# Patient Record
Sex: FEMALE | Race: WHITE | NOT HISPANIC OR LATINO | URBAN - METROPOLITAN AREA
[De-identification: names, ages, dates, MRNs, and addresses within clinical notes are randomized per-mention and may not be internally consistent; named-entity substitution may affect disease eponyms.]

---

## 2018-07-10 ENCOUNTER — OFFICE VISIT (OUTPATIENT)
Dept: CARDIOLOGY | Facility: CLINIC | Age: 53
End: 2018-07-10
Payer: COMMERCIAL

## 2018-07-10 VITALS
DIASTOLIC BLOOD PRESSURE: 94 MMHG | HEIGHT: 64 IN | BODY MASS INDEX: 29.53 KG/M2 | SYSTOLIC BLOOD PRESSURE: 128 MMHG | HEART RATE: 75 BPM | WEIGHT: 173 LBS | RESPIRATION RATE: 14 BRPM | OXYGEN SATURATION: 98 %

## 2018-07-10 DIAGNOSIS — R07.89 OTHER CHEST PAIN: ICD-10-CM

## 2018-07-10 DIAGNOSIS — R00.2 PALPITATIONS: Primary | ICD-10-CM

## 2018-07-10 PROCEDURE — 99243 OFF/OP CNSLTJ NEW/EST LOW 30: CPT | Performed by: INTERNAL MEDICINE

## 2018-07-10 PROCEDURE — 93000 ELECTROCARDIOGRAM COMPLETE: CPT | Performed by: INTERNAL MEDICINE

## 2018-07-10 ASSESSMENT — ENCOUNTER SYMPTOMS
SNORING: 0
SPUTUM PRODUCTION: 0
NERVOUS/ANXIOUS: 0
BRUISES/BLEEDS EASILY: 0
CLAUDICATION: 0
HEMOPTYSIS: 0
EXCESSIVE DAYTIME SLEEPINESS: 0
BACK PAIN: 0
JAUNDICE: 0
HEMATURIA: 0
NEAR-SYNCOPE: 0
COLOR CHANGE: 0
LIGHT-HEADEDNESS: 0
IRREGULAR HEARTBEAT: 0
DYSPNEA ON EXERTION: 0
WHEEZING: 0
UNUSUAL HAIR DISTRIBUTION: 0
FALLS: 0
ORTHOPNEA: 0
DIZZINESS: 0
PND: 0
HEADACHES: 0
FEVER: 0
CHILLS: 0
FLANK PAIN: 0
SYNCOPE: 0
HEMATEMESIS: 0
MYALGIAS: 0
FOCAL WEAKNESS: 0
MEMORY LOSS: 0
PALPITATIONS: 1
SHORTNESS OF BREATH: 0

## 2018-07-10 NOTE — LETTER
"July 10, 2018     Kodi Smith, DO  [address] [facility] [address]    Patient: Jazmyn Herrmann   YOB: 1965   Date of Visit: 7/10/2018       Dear Dr. Smith:    Thank you for referring Jazmyn Herrmann to me for evaluation. Below are my notes for this consultation.    If you have questions, please do not hesitate to call me. I look forward to following your patient along with you.         Sincerely,        Alicia Calix MD        CC: No Recipients  Alicia Calix MD  7/10/2018  3:47 PM  Signed  Cardiology Consult/New Patient    HPI   The patient presents to the office today for evaluation regarding some intermittent palpitations and chest discomfort.  She is a 53-year-old white female whose past medical history is unremarkable aside from a  section and herniated disks in her back.  She has not had any recent hospitalizations or emergency room visits.  Recently she has noticed some intermittent episodes where she gets strange feelings in her chest.  One time while she was driving she felt a sensation of a \"electric shock\" that traveled up her right parasternal border and resolve spontaneously.  She does not have any exertional chest pain, but says that there are times that she feels her chest is not quite normal, but she has difficulty describing it.    The patient goes to the gym several times a week and can do approximately 3 miles on the elliptical without a problem.  She denies any change in her ability to do this.  She denies PND or orthopnea.  On one occasion her symptoms may have been precipitated by a \"metabolism booster\" that she purchased, but she has not used any of this for some time and still has symptoms.  She denies any recent blood work or other cardiac evaluation.  She has not had any significant changes in her dietary intake.  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    Patient has 3 children, 1 set of twins.  She had placenta previa but denies toxemia or preeclampsia.       Past Medical History:   Diagnosis Date   • Lumbar disc herniation     L5 & S1   • Placenta previa     Patient underwent  and delivered twins       Past Surgical History:   Procedure Laterality Date   •  SECTION  2002    Secondary to placenta previa     Social History   Substance Use Topics   • Smoking status: Never Smoker   • Smokeless tobacco: Never Used   • Alcohol use Yes      Comment: 1-2 drinks a week     Family History   Problem Relation Age of Onset   • Adopted: Yes   • Family history unknown: Yes        Allergies: Patient has no known allergies.    Current Medications: No current outpatient prescriptions on file.     Review of Systems   Constitution: Negative for chills and fever.   HENT: Negative for nosebleeds.    Eyes: Negative for visual disturbance.   Cardiovascular: Positive for chest pain and palpitations. Negative for claudication, dyspnea on exertion, irregular heartbeat, leg swelling, near-syncope, orthopnea, paroxysmal nocturnal dyspnea and syncope.   Respiratory: Negative for hemoptysis, shortness of breath, snoring, sputum production and wheezing.    Endocrine: Negative for heat intolerance.   Hematologic/Lymphatic: Negative for bleeding problem. Does not bruise/bleed easily.   Skin: Negative for color change and unusual hair distribution.   Musculoskeletal: Negative for back pain, falls and myalgias.   Gastrointestinal: Negative for hematemesis, jaundice and melena.   Genitourinary: Negative for flank pain and hematuria.        Patient has not had menses for almost 1 year   Neurological: Negative for excessive daytime sleepiness, dizziness, focal weakness, headaches and light-headedness.   Psychiatric/Behavioral: Negative for memory loss. The patient is not nervous/anxious.         Increased stress   Allergic/Immunologic: Negative for  "environmental allergies.     Vitals:    07/10/18 1006   BP: (!) 128/94   Pulse: 75   Resp: 14   SpO2: 98%   Weight: 78.5 kg (173 lb)   Height: 1.626 m (5' 4\")     Physical Exam   Constitutional: She is oriented to person, place, and time. She appears well-developed. She is cooperative.   Overweight white female in no apparent distress   HENT:   Head: Normocephalic and atraumatic.   Mouth/Throat: Oropharynx is clear and moist and mucous membranes are normal.   Eyes: Conjunctivae and EOM are normal. No scleral icterus.   Neck: Trachea normal and normal range of motion. Neck supple. No hepatojugular reflux and no JVD present. Carotid bruit is not present. No thyromegaly present.   Cardiovascular: Normal rate, regular rhythm, S1 normal, S2 normal, normal heart sounds and intact distal pulses.   No extrasystoles are present. Exam reveals no gallop, no S3, no S4 and no friction rub.    No murmur heard.  Pulses:       Carotid pulses are 2+ on the right side, and 2+ on the left side.       Posterior tibial pulses are 2+ on the right side, and 2+ on the left side.   Pulmonary/Chest: Effort normal and breath sounds normal. She has no wheezes. She has no rhonchi. She has no rales.   Abdominal: Soft. Bowel sounds are normal. She exhibits no abdominal bruit. There is no tenderness.   Musculoskeletal: Normal range of motion. She exhibits no tenderness or deformity.   Neurological: She is alert and oriented to person, place, and time. No cranial nerve deficit.   Skin: Skin is warm, dry and intact. No rash noted. No cyanosis. Nails show no clubbing.   Psychiatric: She has a normal mood and affect. Her speech is normal and behavior is normal. Thought content normal. Cognition and memory are normal.        Labs-no recent lab work    Imaging: No recent imaging    EKG: Sinus rhythm, poor R-wave progression and low voltage in precordial leads, likely secondary to body habitus       Other chest pain  The patient has intermittent chest " discomfort of varying types.  She has no idea regarding her family history because she was adopted.  I asked the patient to return to the office for an echocardiogram and routine treadmill stress test.  We should rule out any wall motion abnormality or structural heart disease.  I am also providing her with an order for lab work-she states she has not had anything done recently.  We discussed other options for risk stratification and she would like to move forward with CT calcium scoring.  I will contact her to discuss all of her test results by telephone.    Palpitations  Palpitations are random and self terminating.  I think her symptoms could be related to hormonal changes, but I am ordering routine chemistries, TSH, lipid panel and liver function testing for a baseline.  She should avoid metabolism boosters or other excessive stimulants such as caffeine.      Return if symptoms worsen or fail to improve.  I will contact her by telephone to review all of her test results.  She should continue with regular exercise and weight loss efforts.  Thank you for allowing me to participate in the care of this patient.  I hope this information is helpful.  Please do not hesitate to contact me with any questions or concerns.    Alicia Calix MD  7/10/2018

## 2018-07-10 NOTE — ASSESSMENT & PLAN NOTE
The patient has intermittent chest discomfort of varying types.  She has no idea regarding her family history because she was adopted.  I asked the patient to return to the office for an echocardiogram and routine treadmill stress test.  We should rule out any wall motion abnormality or structural heart disease.  I am also providing her with an order for lab work-she states she has not had anything done recently.  We discussed other options for risk stratification and she would like to move forward with CT calcium scoring.  I will contact her to discuss all of her test results by telephone.

## 2018-07-10 NOTE — ASSESSMENT & PLAN NOTE
Palpitations are random and self terminating.  I think her symptoms could be related to hormonal changes, but I am ordering routine chemistries, TSH, lipid panel and liver function testing for a baseline.  She should avoid metabolism boosters or other excessive stimulants such as caffeine.

## 2018-07-10 NOTE — PROGRESS NOTES
"Cardiology Consult/New Patient    HPI   The patient presents to the office today for evaluation regarding some intermittent palpitations and chest discomfort.  She is a 53-year-old white female whose past medical history is unremarkable aside from a  section and herniated disks in her back.  She has not had any recent hospitalizations or emergency room visits.  Recently she has noticed some intermittent episodes where she gets strange feelings in her chest.  One time while she was driving she felt a sensation of a \"electric shock\" that traveled up her right parasternal border and resolve spontaneously.  She does not have any exertional chest pain, but says that there are times that she feels her chest is not quite normal, but she has difficulty describing it.    The patient goes to the gym several times a week and can do approximately 3 miles on the elliptical without a problem.  She denies any change in her ability to do this.  She denies PND or orthopnea.  On one occasion her symptoms may have been precipitated by a \"metabolism booster\" that she purchased, but she has not used any of this for some time and still has symptoms.  She denies any recent blood work or other cardiac evaluation.  She has not had any significant changes in her dietary intake.  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    Patient has 3 children, 1 set of twins.  She had placenta previa but denies toxemia or preeclampsia.       Past Medical History:   Diagnosis Date   • Lumbar disc herniation     L5 & S1   • Placenta previa     Patient underwent  and delivered twins       Past Surgical History:   Procedure Laterality Date   •  SECTION  2002    Secondary to placenta previa     Social History   Substance Use Topics   • Smoking status: Never Smoker   • Smokeless tobacco: Never Used   • Alcohol use Yes " "     Comment: 1-2 drinks a week     Family History   Problem Relation Age of Onset   • Adopted: Yes   • Family history unknown: Yes        Allergies: Patient has no known allergies.    Current Medications: No current outpatient prescriptions on file.     Review of Systems   Constitution: Negative for chills and fever.   HENT: Negative for nosebleeds.    Eyes: Negative for visual disturbance.   Cardiovascular: Positive for chest pain and palpitations. Negative for claudication, dyspnea on exertion, irregular heartbeat, leg swelling, near-syncope, orthopnea, paroxysmal nocturnal dyspnea and syncope.   Respiratory: Negative for hemoptysis, shortness of breath, snoring, sputum production and wheezing.    Endocrine: Negative for heat intolerance.   Hematologic/Lymphatic: Negative for bleeding problem. Does not bruise/bleed easily.   Skin: Negative for color change and unusual hair distribution.   Musculoskeletal: Negative for back pain, falls and myalgias.   Gastrointestinal: Negative for hematemesis, jaundice and melena.   Genitourinary: Negative for flank pain and hematuria.        Patient has not had menses for almost 1 year   Neurological: Negative for excessive daytime sleepiness, dizziness, focal weakness, headaches and light-headedness.   Psychiatric/Behavioral: Negative for memory loss. The patient is not nervous/anxious.         Increased stress   Allergic/Immunologic: Negative for environmental allergies.     Vitals:    07/10/18 1006   BP: (!) 128/94   Pulse: 75   Resp: 14   SpO2: 98%   Weight: 78.5 kg (173 lb)   Height: 1.626 m (5' 4\")     Physical Exam   Constitutional: She is oriented to person, place, and time. She appears well-developed. She is cooperative.   Overweight white female in no apparent distress   HENT:   Head: Normocephalic and atraumatic.   Mouth/Throat: Oropharynx is clear and moist and mucous membranes are normal.   Eyes: Conjunctivae and EOM are normal. No scleral icterus.   Neck: Trachea " normal and normal range of motion. Neck supple. No hepatojugular reflux and no JVD present. Carotid bruit is not present. No thyromegaly present.   Cardiovascular: Normal rate, regular rhythm, S1 normal, S2 normal, normal heart sounds and intact distal pulses.   No extrasystoles are present. Exam reveals no gallop, no S3, no S4 and no friction rub.    No murmur heard.  Pulses:       Carotid pulses are 2+ on the right side, and 2+ on the left side.       Posterior tibial pulses are 2+ on the right side, and 2+ on the left side.   Pulmonary/Chest: Effort normal and breath sounds normal. She has no wheezes. She has no rhonchi. She has no rales.   Abdominal: Soft. Bowel sounds are normal. She exhibits no abdominal bruit. There is no tenderness.   Musculoskeletal: Normal range of motion. She exhibits no tenderness or deformity.   Neurological: She is alert and oriented to person, place, and time. No cranial nerve deficit.   Skin: Skin is warm, dry and intact. No rash noted. No cyanosis. Nails show no clubbing.   Psychiatric: She has a normal mood and affect. Her speech is normal and behavior is normal. Thought content normal. Cognition and memory are normal.        Labs-no recent lab work    Imaging: No recent imaging    EKG: Sinus rhythm, poor R-wave progression and low voltage in precordial leads, likely secondary to body habitus       Other chest pain  The patient has intermittent chest discomfort of varying types.  She has no idea regarding her family history because she was adopted.  I asked the patient to return to the office for an echocardiogram and routine treadmill stress test.  We should rule out any wall motion abnormality or structural heart disease.  I am also providing her with an order for lab work-she states she has not had anything done recently.  We discussed other options for risk stratification and she would like to move forward with CT calcium scoring.  I will contact her to discuss all of her test  results by telephone.    Palpitations  Palpitations are random and self terminating.  I think her symptoms could be related to hormonal changes, but I am ordering routine chemistries, TSH, lipid panel and liver function testing for a baseline.  She should avoid metabolism boosters or other excessive stimulants such as caffeine.      Return if symptoms worsen or fail to improve.  I will contact her by telephone to review all of her test results.  She should continue with regular exercise and weight loss efforts.  Thank you for allowing me to participate in the care of this patient.  I hope this information is helpful.  Please do not hesitate to contact me with any questions or concerns.    Alicia Calix MD  7/10/2018

## 2018-07-18 LAB
ALBUMIN SERPL-MCNC: 4.3 G/DL (ref 3.5–5.5)
ALP SERPL-CCNC: 93 IU/L (ref 39–117)
ALT SERPL-CCNC: 17 IU/L (ref 0–32)
AST SERPL-CCNC: 19 IU/L (ref 0–40)
BILIRUB DIRECT SERPL-MCNC: 0.09 MG/DL (ref 0–0.4)
BILIRUB SERPL-MCNC: 0.3 MG/DL (ref 0–1.2)
BUN SERPL-MCNC: 10 MG/DL (ref 6–24)
BUN/CREAT SERPL: 11 (ref 9–23)
CALCIUM SERPL-MCNC: 9.7 MG/DL (ref 8.7–10.2)
CHLORIDE SERPL-SCNC: 102 MMOL/L (ref 96–106)
CHOLEST SERPL-MCNC: 223 MG/DL (ref 100–199)
CO2 SERPL-SCNC: 26 MMOL/L (ref 20–29)
CREAT SERPL-MCNC: 0.88 MG/DL (ref 0.57–1)
GLUCOSE SERPL-MCNC: 112 MG/DL (ref 65–99)
HBA1C MFR BLD: 6.2 % (ref 4.8–5.6)
HDLC SERPL-MCNC: 63 MG/DL
LAB CORP EGFR IF AFRICN AM: 88 ML/MIN/1.73
LAB CORP EGFR IF NONAFRICN AM: 76 ML/MIN/1.73
LDLC SERPL CALC-MCNC: 124 MG/DL (ref 0–99)
POTASSIUM SERPL-SCNC: 4.7 MMOL/L (ref 3.5–5.2)
PROT SERPL-MCNC: 7.1 G/DL (ref 6–8.5)
SODIUM SERPL-SCNC: 143 MMOL/L (ref 134–144)
TRIGL SERPL-MCNC: 181 MG/DL (ref 0–149)
TSH SERPL DL<=0.005 MIU/L-ACNC: 1.88 UIU/ML (ref 0.45–4.5)
VLDLC SERPL CALC-MCNC: 36 MG/DL (ref 5–40)

## 2018-08-07 ENCOUNTER — HOSPITAL ENCOUNTER (OUTPATIENT)
Dept: CARDIOLOGY | Facility: CLINIC | Age: 53
Discharge: HOME | End: 2018-08-07
Payer: COMMERCIAL

## 2018-08-07 VITALS
SYSTOLIC BLOOD PRESSURE: 120 MMHG | BODY MASS INDEX: 29.02 KG/M2 | HEIGHT: 64 IN | DIASTOLIC BLOOD PRESSURE: 70 MMHG | WEIGHT: 170 LBS

## 2018-08-07 VITALS
BODY MASS INDEX: 29.02 KG/M2 | WEIGHT: 170 LBS | DIASTOLIC BLOOD PRESSURE: 70 MMHG | HEIGHT: 64 IN | SYSTOLIC BLOOD PRESSURE: 120 MMHG

## 2018-08-07 DIAGNOSIS — R00.2 PALPITATIONS: ICD-10-CM

## 2018-08-07 DIAGNOSIS — R07.89 OTHER CHEST PAIN: ICD-10-CM

## 2018-08-07 LAB
AORTIC ROOT ANNULUS - M-MODE: 3.5 CM
AORTIC VALVE MEAN VELOCITY: 0.81 M/S
AORTIC VALVE VELOCITY TIME INTEGRAL: 24.6 CM
ASCENDING AORTA: 3.2 CM
AV MEAN GRADIENT: 3 MMHG
AV PEAK GRADIENT: 6 MMHG
AV PEAK VELOCITY-S: 1.27 M/S
AV VALVE AREA: 2.04 CM2
BSA FOR ECHO PROCEDURE: 1.87 M2
CUSP SEPARATION: 1.9 CM
DOP CALC LVOT STROKE VOLUME: 50.16 ML
E WAVE DECELERATION TIME: 232 MS
E/A RATIO: 1.1
E/E' RATIO: 8.8
E/LAT E' RATIO: 7.4
EDV (BP): 72.2 CM3
EF (A4C): 57 %
EF A2C: 68.3 %
EJECTION FRACTION: 64.4 %
ESV (BP): 25.7 CM3
FRACTIONAL SHORTENING: 34.5 %
INTERVENTRICULAR SEPTUM: 0.78 CM
LA ESV (BP): 49.9 CM3
LA ESV INDEX (A2C): 24.71 CM3/M2
LA ESV INDEX (BP): 26.68 CM3/M2
LA/AORTA RATIO: 1.17
LAAS-AP2: 17.6 CM2
LAAS-AP4: 17.4 CM2
LAD 2D - M-MODE: 4.1 CM
LALD A4C: 4.99 CM
LALD A4C: 5.38 CM
LAV-S: 46.2 CM3
LEFT ATRIUM VOLUME INDEX: 27.11 CM3/M2
LEFT ATRIUM VOLUME: 50.7 CM3
LEFT INTERNAL DIMENSION IN SYSTOLE: 3.25 CM (ref 2.54–3.84)
LEFT VENTRICLE DIASTOLIC VOLUME INDEX: 40.75 CM3/M2
LEFT VENTRICLE DIASTOLIC VOLUME: 76.2 CM3
LEFT VENTRICLE SYSTOLIC VOLUME INDEX: 17.49 CM3/M2
LEFT VENTRICLE SYSTOLIC VOLUME: 32.7 CM3
LEFT VENTRICULAR INTERNAL DIMENSION IN DIASTOLE: 4.96 CM (ref 4.29–5.96)
LEFT VENTRICULAR POSTERIOR WALL IN END DIASTOLE: 0.75 CM (ref 0.56–1.05)
LV DIASTOLIC VOLUME: 65 CM3
LV ESV (APICAL 2 CHAMBER): 20.6 CM3
LVAD-AP2: 23.1 CM2
LVAD-AP4: 25.9 CM2
LVAS-AP2: 12.2 CM2
LVAS-AP4: 15 CM2
LVEDVI(A2C): 34.76 CM3/M2
LVEDVI(BP): 38.61 CM3/M2
LVESVI(A2C): 11.02 CM3/M2
LVESVI(BP): 13.74 CM3/M2
LVLD-AP2: 6.91 CM
LVLD-AP4: 7.32 CM
LVLS-AP2: 6.12 CM
LVLS-AP4: 6.08 CM
LVOT 2D: 1.9 CM
LVOT A: 2.84 CM2
LVOT MG: 2 MMHG
LVOT MV: 0.62 M/S
LVOT PEAK VELOCITY: 0.89 M/S
LVOT VTI: 17.7 CM
MV E'TISSUE VEL-LAT: 0.1 M/S
MV E'TISSUE VEL-MED: 0.09 M/S
MV PEAK A VEL: 0.7 M/S
MV PEAK E VEL: 0.75 M/S
POSTERIOR WALL: 0.75 CM
RVOT VMAX: 0.62 M/S
TR MAX PG: 9 MMHG
TRICUSPID VALVE PEAK REGURGITATION VELOCITY: 1.54 M/S
Z-SCORE OF LEFT VENTRICULAR DIMENSION IN END DIASTOLE: -0.19
Z-SCORE OF LEFT VENTRICULAR DIMENSION IN END SYSTOLE: 0.32
Z-SCORE OF LEFT VENTRICULAR POSTERIOR WALL IN END DIASTOLE: -0.12

## 2018-08-07 PROCEDURE — 93306 TTE W/DOPPLER COMPLETE: CPT | Performed by: INTERNAL MEDICINE

## 2018-08-13 NOTE — PROGRESS NOTES
Called and reviewed test results.  Advised increased exercise and dietary discretion.  The patient would prefer to avoid medication.  She had her CT calcium score performed at [facility] on [address] and I will try to get those results.

## 2018-08-14 LAB
STRESS ANGINA INDEX: 0
STRESS BASELINE BP: NORMAL MMHG
STRESS BASELINE HR: 80 BPM
STRESS O2 SAT REST: 93 %
STRESS PERCENT HR: 94 %
STRESS POST ESTIMATED WORKLOAD: 12 METS
STRESS POST EXERCISE DUR MIN: 9 MIN
STRESS POST EXERCISE DUR SEC: 2 SEC
STRESS POST PEAK BP: NORMAL MMHG
STRESS POST PEAK HR: 159 BPM
STRESS TARGET HR: 144 BPM

## 2023-01-26 ENCOUNTER — APPOINTMENT (RX ONLY)
Dept: URBAN - METROPOLITAN AREA CLINIC 23 | Facility: CLINIC | Age: 58
Setting detail: DERMATOLOGY
End: 2023-01-26

## 2023-01-26 DIAGNOSIS — L60.1 ONYCHOLYSIS: ICD-10-CM

## 2023-01-26 PROCEDURE — ? TREATMENT REGIMEN

## 2023-01-26 PROCEDURE — ? PHOTO-DOCUMENTATION

## 2023-01-26 PROCEDURE — ? COUNSELING

## 2023-01-26 PROCEDURE — 99202 OFFICE O/P NEW SF 15 MIN: CPT

## 2023-01-26 PROCEDURE — ? DIAGNOSIS COMMENT

## 2023-01-26 ASSESSMENT — LOCATION SIMPLE DESCRIPTION DERM: LOCATION SIMPLE: LEFT RING FINGERNAIL

## 2023-01-26 ASSESSMENT — LOCATION DETAILED DESCRIPTION DERM: LOCATION DETAILED: LEFT RING FINGERNAIL

## 2023-01-26 ASSESSMENT — LOCATION ZONE DERM: LOCATION ZONE: FINGERNAIL

## 2023-01-26 NOTE — PROCEDURE: TREATMENT REGIMEN
Plan: Cover with bandage when playing golf and tennis. Avoid any nail polish or nail product on the nail.\\n\\nT/C biopsy if nail bed reveals lesion or growth, or if patient develops any blisters or rash (r/o porphyria or pseudoporphyria)
Initiate Treatment: 50/50 white vinegar/water soak: soak finger in vinegar solution for 15 minutes QD. Keep nails trimmed short.
Detail Level: Simple

## 2023-01-26 NOTE — PROCEDURE: DIAGNOSIS COMMENT
Comment: Idiopathic (possible irritant etiology from nail polish removing chemical)
Render Risk Assessment In Note?: no
Detail Level: Simple

## 2023-01-26 NOTE — HPI: NAIL DYSTROPHY
How Severe Is It?: mild
Is This A New Presentation, Or A Follow-Up?: Nail Dystrophy
Additional History: Patient states just before fake nail was removed a few weeks ago, nail was fragile and seemed to be . Patent has been keeping it covered with a bandage to avoid trauma or further lifting. Denies any pain or tenderness.

## 2023-02-07 ENCOUNTER — APPOINTMENT (RX ONLY)
Dept: URBAN - METROPOLITAN AREA CLINIC 23 | Facility: CLINIC | Age: 58
Setting detail: DERMATOLOGY
End: 2023-02-07

## 2023-02-07 DIAGNOSIS — L60.1 ONYCHOLYSIS: ICD-10-CM | Status: IMPROVED

## 2023-02-07 PROCEDURE — ? DIAGNOSIS COMMENT

## 2023-02-07 PROCEDURE — ? COUNSELING

## 2023-02-07 PROCEDURE — ? TREATMENT REGIMEN

## 2023-02-07 PROCEDURE — ? PRESCRIPTION

## 2023-02-07 PROCEDURE — ? PHOTO-DOCUMENTATION

## 2023-02-07 PROCEDURE — 99213 OFFICE O/P EST LOW 20 MIN: CPT

## 2023-02-07 RX ORDER — EFINACONAZOLE 100 MG/ML
SOLUTION TOPICAL
Qty: 8 | Refills: 6 | Status: ERX | COMMUNITY
Start: 2023-02-07

## 2023-02-07 RX ADMIN — EFINACONAZOLE: 100 SOLUTION TOPICAL at 00:00

## 2023-02-07 ASSESSMENT — LOCATION DETAILED DESCRIPTION DERM: LOCATION DETAILED: LEFT RING FINGERNAIL

## 2023-02-07 ASSESSMENT — LOCATION SIMPLE DESCRIPTION DERM: LOCATION SIMPLE: LEFT RING FINGERNAIL

## 2023-02-07 ASSESSMENT — LOCATION ZONE DERM: LOCATION ZONE: FINGERNAIL

## 2023-02-07 NOTE — PROCEDURE: TREATMENT REGIMEN
Plan: No lesion observed under nail, possible psoriatic nail, some subungal subungal debris on exam, will treat with Phoebe to prevent any onychomychosis.
Discontinue Regimen: For now hold on 50/50 white vinegar/water soak
Initiate Treatment: Jublia 10 % topical solution with applicator -Apply to affected nail once at night x 48 weeks
Detail Level: Simple